# Patient Record
Sex: FEMALE | Race: WHITE | NOT HISPANIC OR LATINO | Employment: OTHER | URBAN - METROPOLITAN AREA
[De-identification: names, ages, dates, MRNs, and addresses within clinical notes are randomized per-mention and may not be internally consistent; named-entity substitution may affect disease eponyms.]

---

## 2017-12-05 ENCOUNTER — OFFICE VISIT (OUTPATIENT)
Dept: HEMATOLOGY | Facility: CLINIC | Age: 67
End: 2017-12-05
Attending: INTERNAL MEDICINE
Payer: MEDICARE

## 2017-12-05 VITALS
HEART RATE: 66 BPM | BODY MASS INDEX: 21.51 KG/M2 | HEIGHT: 65 IN | WEIGHT: 129.1 LBS | OXYGEN SATURATION: 99 % | RESPIRATION RATE: 12 BRPM | TEMPERATURE: 97.5 F | DIASTOLIC BLOOD PRESSURE: 61 MMHG | SYSTOLIC BLOOD PRESSURE: 112 MMHG

## 2017-12-05 DIAGNOSIS — Z86.711 HISTORY OF PULMONARY EMBOLISM: ICD-10-CM

## 2017-12-05 DIAGNOSIS — Z86.718 HISTORY OF DVT (DEEP VEIN THROMBOSIS): Primary | ICD-10-CM

## 2017-12-05 DIAGNOSIS — Z79.01 LONG TERM CURRENT USE OF ANTICOAGULANT THERAPY: ICD-10-CM

## 2017-12-05 PROCEDURE — 99213 OFFICE O/P EST LOW 20 MIN: CPT | Performed by: INTERNAL MEDICINE

## 2017-12-05 PROCEDURE — 99211 OFF/OP EST MAY X REQ PHY/QHP: CPT

## 2017-12-05 RX ORDER — CALCIUM CARBONATE 500(1250)
1 TABLET ORAL 2 TIMES DAILY
COMMUNITY

## 2017-12-05 ASSESSMENT — PAIN SCALES - GENERAL: PAINLEVEL: NO PAIN (0)

## 2017-12-05 NOTE — MR AVS SNAPSHOT
"              After Visit Summary   12/5/2017    Mary Lemon    MRN: [id number]           Patient Information     Date Of Birth          1950        Visit Information        Provider Department      12/5/2017 10:00 AM Onur Schmitz MD [facility]        Today's Diagnoses     History of DVT (deep vein thrombosis)    -  1    History of pulmonary embolism        Long term current use of anticoagulant therapy          Care Instructions    1.  Continue on Warfarin with home INR monitor.  2.  See back in 1 year.          Follow-ups after your visit        Your next 10 appointments already scheduled     Nov 13, 2018  1:30 PM CST   RETURN CLOTTING DISORDER with Onur Schmitz MD   [facility] ([facility])    [facility] [place] [id number] [phone]              Who to contact     If you have questions or need follow up information about today's clinic visit or your schedule please contact [facility] directly at [phone].  Normal or non-critical lab and imaging results will be communicated to you by Comekshart, letter or phone within 4 business days after the clinic has received the results. If you do not hear from us within 7 days, please contact the clinic through Uniquedut or phone. If you have a critical or abnormal lab result, we will notify you by phone as soon as possible.  Submit refill requests through shopa or call your pharmacy and they will forward the refill request to us. Please allow 3 business days for your refill to be completed.          Additional Information About Your Visit        ComeksharSqeeqee Information     shopa lets you send messages to your doctor, view your test results, renew your prescriptions, schedule appointments and more. To sign up, go to [email or web address] . Click on \"Log in\" on the left side of the screen, " "which will take you to the Welcome page. Then click on \"Sign up Now\" on the right side of the page.     You will be asked to enter the access code listed below, as well as some personal information. Please follow the directions to create your username and password.     Your access code is: [email or web address]  Expires: 3/5/2018 10:41 AM     Your access code will  in 90 days. If you need help or a new code, please call your [facility]3] or [phone].        Care EveryWhere ID     This is your Care EveryWhere ID. This could be used by other organizations to access your [place] medical records  [id number]        Your Vitals Were     Pulse Temperature Respirations Height Pulse Oximetry BMI (Body Mass Index)    66 97.5  F (36.4  C) (Oral) 12 1.638 m (5' 4.5\") 99% 21.82 kg/m2       Blood Pressure from Last 3 Encounters:   17 112/61   10/17/16 125/75   10/06/15 134/69    Weight from Last 3 Encounters:   17 58.6 kg (129 lb 1.6 oz)   10/17/16 59.1 kg (130 lb 4.8 oz)   10/06/15 60.1 kg (132 lb 8 oz)              Today, you had the following     No orders found for display       Primary Care Provider    None Specified       No primary provider on file.        Equal Access to Services     [facility] Hospital[facility] : Hadii judd Brunson, waaxda aixa, qaybta pito piedra . So [facility] [phone].    ATENCIÓN: Si habla español, tiene a moseley disposición servicios gratuitos de asistencia lingüística. Gladys al [phone].    We comply with applicable federal civil rights laws and [place] laws. We do not discriminate on the basis of race, color, national origin, age, disability, sex, sexual orientation, or gender identity.            Thank you!     Thank you for choosing [facility]  for your care. Our goal is always to provide you with excellent care. Hearing back from our patients is one way we can continue to improve our services. " Please take a few minutes to complete the written survey that you may receive in the mail after your visit with us. Thank you!             Your Updated Medication List - Protect others around you: Learn how to safely use, store and throw away your medicines at www.disposemymeds.org.          This list is accurate as of: 12/5/17 10:41 AM.  Always use your most recent med list.                   Brand Name Dispense Instructions for use Diagnosis    calcium carbonate 1250 MG tablet    OS-QUIRINO 500 mg [place]. Ca     Take 1 tablet by mouth 2 times daily        COUMADIN PO           lovastatin 40 MG tablet    MEVACOR     Take 40 mg by mouth At Bedtime        VITAMIN D (CHOLECALCIFEROL) PO      Take 1,000 Units by mouth daily

## 2017-12-05 NOTE — PROGRESS NOTES
[facility]  Outpatient Clinic Visit    Mary Lemon is a 66-year-old woman with a history of recurrent venous thromboembolism who returns today to review her long-term anticoagulation plan.  She had a provoked right calf deep vein thrombosis in 1999 associated with hormone replacement therapy for which she received 3 months of anticoagulation.  She had another provoked right lower extremity deep vein thrombosis in 2006 secondary to a prolonged car ride for which she received 6 months of anticoagulation.  In 03/2015 she had bilateral pulmonary emboli provoked again by prolonged car travel.  She has remained on long-term anticoagulation since that time.  In November 2015 we stopped her anticoagulation to allow for laboratory testing to try to determine the degree of activation of her clotting system at baseline.  Those results returned showing an elevated  D-dimer of 0.9 and factor VIII activity of 232.  Fibrinogen and prothrombin fragment levels were normal.  Note that she has had factor VIII levels elevated in the 230% range on two previous occasions dating back over the last 10 years as well.  Previous full hypercoagulable workup was unrevealing.  After the results of those tests, she elected to resume anticoagulation with warfarin.       Overall, things are going quite well for Mary on warfarin.  She is still using a home INR monitor.  Her INR is very stable in the target range of 2-3.  In the last year she has had only 2 values outside the target range (one 3.8, and one 1.9).  She is testing every 2 weeks.  She has had no bleeding issues.  She has had nothing to suggest recurrent thrombosis or post-thrombotic syndrome.       Overall, she has remained healthy.  She and her  will spend the winter again in [place], north of [facility].       PHYSICAL EXAMINATION:  Overall, she looks quite healthy.  A detailed exam was not performed today.       LABORATORY DATA:  No new labs  were obtained as part of today's visit.        ASSESSMENT AND PLAN:   1.  Provoked right lower extremity deep vein thrombosis in 1999 associated with hormone replacement therapy, status post 3 months of anticoagulation.   2.  Provoked right lower extremity deep vein thrombosis in 2006 secondary to prolonged car travel, status post 6 months of anticoagulation.    3.  Bilateral pulmonary emboli in 03/2015 probably provoked by prolonged car travel, status post  6 months of anticoagulation.   4.  Subsequent laboratory testing showing evidence of baseline activation of the clotting system with an elevated  D-dimer and a persistently elevated factor VIII level.   5.  Long-term anticoagulation.      Mary is doing well on long term anticoagulation, for which she remains an appropriate candidate, and we will make no change in her overall care plan today.  She is comfortable remaining on warfarin rather than switching to one of the DOACs.  We should continue to revisit this annually.  She will continue with her home INR monitoring with which she is very satisfied.  She will call if she has any new questions or concerns between now and her return visit next year.       Total time spent today was 15 minutes, all of which was in counseling and coordination of care.       Onur Schmitz MD  Associate Professor of Medicine  Division of Hematology, Oncology, and Transplantation  Director, [facility]

## 2017-12-05 NOTE — NURSING NOTE
Face to face time with patient taking vital signs, reviewing allergies, medications and establishing focus of the visit: 6 minutes with CNA, 3 minutes with RN.  Manda Veliz RN - Nurse Clinician - [facility] - [phone]

## 2017-12-05 NOTE — NURSING NOTE
Mary Lemon  MRN:   [id number]  Female, 66 year old, 1950     Saw Manda with Dr. Schmitz.  She has had no bleeding issues.  She has had very stable INRs with home monitor.  She has no upcoming procedures, but has dental cleaning tomorrow.  If she had procedures, we most likely would use Lovenox 40 mg daily after restarting Warfarin.  We will see her back in 1 year.  She was given our new contact information for our clinic.  Jacinta Sewell, RN, MSN -Nurse Clinician, [facility]

## 2018-09-25 ENCOUNTER — TELEPHONE (OUTPATIENT)
Dept: HEMATOLOGY | Facility: CLINIC | Age: 68
End: 2018-09-25

## 2018-12-03 ENCOUNTER — OFFICE VISIT (OUTPATIENT)
Dept: HEMATOLOGY | Facility: CLINIC | Age: 68
End: 2018-12-03
Attending: PHYSICIAN ASSISTANT
Payer: MEDICARE

## 2018-12-03 VITALS
HEART RATE: 64 BPM | OXYGEN SATURATION: 97 % | HEIGHT: 65 IN | BODY MASS INDEX: 21.34 KG/M2 | RESPIRATION RATE: 12 BRPM | TEMPERATURE: 97.6 F | DIASTOLIC BLOOD PRESSURE: 74 MMHG | SYSTOLIC BLOOD PRESSURE: 136 MMHG | WEIGHT: 128.1 LBS

## 2018-12-03 DIAGNOSIS — Z86.718 HISTORY OF DEEP VENOUS THROMBOSIS: ICD-10-CM

## 2018-12-03 DIAGNOSIS — Z86.711 HISTORY OF PULMONARY EMBOLISM: ICD-10-CM

## 2018-12-03 DIAGNOSIS — Z79.01 CHRONIC ANTICOAGULATION: Primary | ICD-10-CM

## 2018-12-03 PROCEDURE — 99213 OFFICE O/P EST LOW 20 MIN: CPT | Performed by: PHYSICIAN ASSISTANT

## 2018-12-03 PROCEDURE — G0463 HOSPITAL OUTPT CLINIC VISIT: HCPCS

## 2018-12-03 ASSESSMENT — PAIN SCALES - GENERAL: PAINLEVEL: NO PAIN (0)

## 2018-12-03 NOTE — NURSING NOTE
Mary Lemon  MRN: [id number]  Female, 67 year old, 1950  Hx recurrent DVT/PE  Long-term anticoagulation with Warfarin & home INR monitor     Saw Mary Lemon with NITISH Leyva today.  She has been using home INR monitor and was affected by strip recall, but now has new strips and INR stable.  She has had no new clotting and having no bleeding issues on Warfarin.  Discussed option of Xarelto and she was interested, but cost would be $200+/month and Warfarin & test strips covered by her insurance.  She will stay on Warfarin and see back annually to review.  Jacinta Sewell, RN, MSN -Nurse Clinician, [facility] [phone]

## 2018-12-03 NOTE — PATIENT INSTRUCTIONS
1.  Call with surgeries or procedures.  2.  Stay on Warfarin.  3.  See Dr. Schmitz or NITISH Leyva back in 1 year.

## 2018-12-03 NOTE — PROGRESS NOTES
[facility]  [address] [place] [id number]  Main: [phone], Fax: [phone]    Patient seen at: [facility] at [address] [place] [address].    Outpatient Visit Note:    Patient: Mary Lemon  MRN: [id number]  : 1950  VIRAL: December 3, 2018    Reason:  History recurrent DVT and PE. On chronic anticoagulation therapy. Here for her routine annual follow up clinic visit.     Clinical History Summary:  Mary Lemon is a 67-year-old woman with a history of recurrent venous thromboembolism who returns today to review her long-term anticoagulation plan.  She had a provoked right calf deep vein thrombosis in  associated with hormone replacement therapy for which she received 3 months of anticoagulation. She had another provoked right lower extremity deep vein thrombosis in  secondary to a prolonged car ride for which she received 6 months of anticoagulation.  In 2015 she had bilateral pulmonary emboli provoked again by prolonged car travel.  She has remained on long-term anticoagulation since that time.  In 2015 we stopped her anticoagulation to allow for laboratory testing to try to determine the degree of activation of her clotting system at baseline.  Those results returned showing an elevated  D-dimer of 0.9 and factor VIII activity of 232.  Fibrinogen and prothrombin fragment levels were normal.  Note that she has had factor VIII levels elevated in the 230% range on two previous occasions dating back over the last 10 years as well.  Previous full hypercoagulable workup was unrevealing.  After the results of those tests, she elected to resume anticoagulation with warfarin with her goal INR at 2.0-3.0.     Interim History:  Mary has a home INR monitor and she was affected by the test strips recall early in the year, although she did not have any issues with bleeding complications or any issues  "with recurrent thromboembolism due to this test strips recall incident. Her INR was erratic for that period of time while she was using the defective test strips.     ROS:  Denies any issues with chest pain. No shortness of breath. Denies any significant lower extremity swelling or pain. Denies any issues with hematuria or blood in stools. Denies any symptoms of epistaxis. No issues with gum bleeding.     Medications, Allergies and PmHx:  All are reviewed by this writer in the electronic medical records today.     Family History and Social History:  Deferred.    Objective:  Pleasant 67 year old female in no acute distress.  Vitals: /74 (BP Location: Right arm, Patient Position: Sitting, Cuff Size: Adult Regular)  Pulse 64  Temp 97.6  F (36.4  C) (Oral)  Resp 12  Ht 1.651 m (5' 5\")  Wt 58.1 kg (128 lb 1.6 oz)  SpO2 97%  BMI 21.32 kg/m2  Exam:  Complete exam is not performed today.    Assessment:  1. Provoked right lower extremity deep vein thrombosis in 1999 associated with hormone replacement therapy, status post 3 months of anticoagulation.   2. Provoked right lower extremity deep vein thrombosis in 2006 secondary to prolonged car travel, status post 6 months of anticoagulation.    3. Bilateral pulmonary emboli in 03/2015 probably provoked by prolonged car travel, status post  6 months of anticoagulation.   4. Subsequent laboratory testing showing evidence of baseline activation of the clotting system with an elevated  D-dimer and a persistently elevated factor VIII level.   5. Long-term anticoagulation.     Plan:  Doing well on anticoagulation therapy with Coumadin. This writer revisited about switching to one of the new oral anticoagulant agents (i.e. Xarelto) with the patient today. We discussed the pros and cons of Coumadin vs Xarelto in details and answered all her questions to her satisfaction today.     After a long discussion, Mary initially was in agreement to switch over to Xarelto at 10 mg " PO Qday dosing. Unfortunately, after we checked into her insurance coverage, her co-pay for Xarelto will be >$200 a month and thus Xarelto is deemed cost prohibiting.     At this time, Mary will remain on Coumadin with her goal INR of 2.0-3.0.     She knows to contact our center if she should need any surgical procedures or invasive procedures. At which time, our clinic will provide her with anticoagulation management plans surrounding the procedures.     Otherwise, we will plan on seeing her back annually for routine follow up clinic visits.    Total Time Spent:  22 minutes, all 22 minutes was spent on face-to-face consultation with the patient and coordination of care in regard to her history of DVT/PE and chronic anticoagulation therapy.     Time IN: 13:36  Time OUT: 13:58      Gorge Yeager PA-C, MPAS  Physician Assistant  [facility].

## 2019-10-10 ENCOUNTER — APPOINTMENT (OUTPATIENT)
Age: 69
Setting detail: DERMATOLOGY
End: 2019-10-16

## 2019-10-10 VITALS — HEIGHT: 65 IN | RESPIRATION RATE: 14 BRPM | WEIGHT: 125 LBS

## 2019-10-10 DIAGNOSIS — L82.1 OTHER SEBORRHEIC KERATOSIS: ICD-10-CM

## 2019-10-10 DIAGNOSIS — L72.0 EPIDERMAL CYST: ICD-10-CM

## 2019-10-10 DIAGNOSIS — L81.4 OTHER MELANIN HYPERPIGMENTATION: ICD-10-CM

## 2019-10-10 DIAGNOSIS — B00.1 HERPESVIRAL VESICULAR DERMATITIS: ICD-10-CM

## 2019-10-10 DIAGNOSIS — L57.0 ACTINIC KERATOSIS: ICD-10-CM

## 2019-10-10 DIAGNOSIS — D18.0 HEMANGIOMA: ICD-10-CM

## 2019-10-10 PROBLEM — D18.01 HEMANGIOMA OF SKIN AND SUBCUTANEOUS TISSUE: Status: ACTIVE | Noted: 2019-10-10

## 2019-10-10 PROCEDURE — 99214 OFFICE O/P EST MOD 30 MIN: CPT | Mod: 25

## 2019-10-10 PROCEDURE — OTHER LIQUID NITROGEN: OTHER

## 2019-10-10 PROCEDURE — OTHER COUNSELING: OTHER

## 2019-10-10 PROCEDURE — 17000 DESTRUCT PREMALG LESION: CPT

## 2019-10-10 PROCEDURE — 17003 DESTRUCT PREMALG LES 2-14: CPT

## 2019-10-10 PROCEDURE — OTHER PRESCRIPTION: OTHER

## 2019-10-10 RX ORDER — VALACYCLOVIR 1 G/1
TABLET ORAL
Qty: 20 | Refills: 1 | Status: ERX | COMMUNITY
Start: 2019-10-10

## 2019-10-10 ASSESSMENT — LOCATION SIMPLE DESCRIPTION DERM
LOCATION SIMPLE: UPPER BACK
LOCATION SIMPLE: RIGHT UPPER BACK
LOCATION SIMPLE: LEFT NOSE
LOCATION SIMPLE: RIGHT UPPER ARM
LOCATION SIMPLE: LEFT LIP
LOCATION SIMPLE: CHEST
LOCATION SIMPLE: RIGHT CHEEK
LOCATION SIMPLE: LEFT CHEEK
LOCATION SIMPLE: RIGHT LIP
LOCATION SIMPLE: RIGHT FOREHEAD

## 2019-10-10 ASSESSMENT — LOCATION DETAILED DESCRIPTION DERM
LOCATION DETAILED: LEFT NASAL ALA
LOCATION DETAILED: RIGHT ANTERIOR DISTAL UPPER ARM
LOCATION DETAILED: LOWER STERNUM
LOCATION DETAILED: LEFT MEDIAL MALAR CHEEK
LOCATION DETAILED: RIGHT INFERIOR FOREHEAD
LOCATION DETAILED: MIDDLE STERNUM
LOCATION DETAILED: LEFT SUPERIOR VERMILION LIP
LOCATION DETAILED: RIGHT MEDIAL SUPERIOR CHEST
LOCATION DETAILED: RIGHT MID-UPPER BACK
LOCATION DETAILED: RIGHT CENTRAL MALAR CHEEK
LOCATION DETAILED: RIGHT INFERIOR VERMILION LIP
LOCATION DETAILED: SUPERIOR THORACIC SPINE

## 2019-10-10 ASSESSMENT — LOCATION ZONE DERM
LOCATION ZONE: LIP
LOCATION ZONE: ARM
LOCATION ZONE: TRUNK
LOCATION ZONE: NOSE
LOCATION ZONE: FACE

## 2019-10-10 NOTE — PROCEDURE: LIQUID NITROGEN
Duration Of Freeze Thaw-Cycle (Seconds): 0
Render Post-Care Instructions In Note?: yes
Post-Care Instructions: Patient was instructed to avoid picking at any of the treated lesions. Should any lesions treated today not be resolved within 8 weeks or if not certain, then return to clinic for re-evaluation.
Detail Level: Detailed
Consent: Discussed that these are a result of cumulative sun exposure. Verbal and written consent was obtained, and risks were reviewed prior to procedure today. Risks discussed include but are not limited to pain, crusting, scabbing, blistering, scarring, temporary or permanent darker or lighter pigmentary change, recurrence, incomplete resolution, and infection.
Render Note In Bullet Format When Appropriate: No

## 2019-11-02 NOTE — PROGRESS NOTES
[facility]  [facility]  [facility] [place]note 2[facility], [place] [address]  Main: [phone], Fax: [phone]        Outpatient Clinic Visit  Date:  11/04/2019    Mary Lemon is a 68-year-old woman with a history of recurrent venous thromboembolism who returns today to review her long-term anticoagulation plan.  She was last seen in December 2017.      She had a provoked right calf deep vein thrombosis in 1999 associated with hormone replacement therapy for which she received 3 months of anticoagulation.  She had another provoked right lower extremity deep vein thrombosis in 2006 secondary to a prolonged car ride for which she received 6 months of anticoagulation.  In 03/2015 she had bilateral pulmonary emboli provoked again by prolonged car travel.  She has remained on long-term anticoagulation since that time.  In November 2015 we stopped her anticoagulation to allow for laboratory testing to try to determine the degree of activation of her clotting system at baseline.  Those results returned showing an elevated  D-dimer of 0.9 and factor VIII activity of 232.  Fibrinogen and prothrombin fragment levels were normal.  Note that she has had factor VIII levels elevated in the 230% range on two previous occasions dating back over the last 10 years as well.  Previous full hypercoagulable workup was unrevealing.  After the results of those tests, she elected to resume anticoagulation with warfarin.       Overall, she is continuing to do quite well.  She is still using a home INR monitor.  Her INR is very stable in the target range of 2-3.  She is testing every 2 weeks.  She has had no bleeding issues.  She did recently have a supratherapeutic INR of 6.2 associated with antibiotic use for a UTI.  This came back into range after holding warfarin for a couple of days under the direction of her anticoagulation monitoring team.  She has had nothing to suggest  recurrent thrombosis or post-thrombotic syndrome.       Overall, she has remained healthy.  She and her  will spend the winter again in [place], north of [facility].       PHYSICAL EXAMINATION:  Overall, she looks quite healthy.  A detailed exam was not performed today.       LABORATORY DATA:  No new labs were obtained as part of today's visit.        ASSESSMENT AND PLAN:   1.  Provoked right lower extremity deep vein thrombosis in 1999 associated with hormone replacement therapy, status post 3 months of anticoagulation.   2.  Provoked right lower extremity deep vein thrombosis in 2006 secondary to prolonged car travel, status post 6 months of anticoagulation.    3.  Bilateral pulmonary emboli in 03/2015 probably provoked by prolonged car travel, status post  6 months of anticoagulation.   4.  Subsequent laboratory testing showing evidence of baseline activation of the clotting system with an elevated  D-dimer and a persistently elevated factor VIII level.   5.  Long-term anticoagulation.      Mary is doing well on long term anticoagulation, for which she remains an appropriate candidate, and we will make no change in her overall care plan today.  She is comfortable remaining on warfarin rather than switching to one of the DOACs.  We have discussed this in the past as well.  She will continue with her home INR monitoring.  We should continue to see her back annually.  She will call with any new questions or concerns in the meantime.     Total time spent today was 15 minutes, all of which was in counseling and coordination of care.       Onur Schmitz MD  Associate Professor of Medicine  Division of Hematology, Oncology, and Transplantation  Director, [facility]

## 2019-11-04 ENCOUNTER — OFFICE VISIT (OUTPATIENT)
Dept: HEMATOLOGY | Facility: CLINIC | Age: 69
End: 2019-11-04
Attending: INTERNAL MEDICINE
Payer: MEDICARE

## 2019-11-04 VITALS
HEIGHT: 65 IN | TEMPERATURE: 97.3 F | WEIGHT: 126.4 LBS | BODY MASS INDEX: 21.06 KG/M2 | SYSTOLIC BLOOD PRESSURE: 148 MMHG | DIASTOLIC BLOOD PRESSURE: 76 MMHG | RESPIRATION RATE: 12 BRPM | OXYGEN SATURATION: 98 % | HEART RATE: 71 BPM

## 2019-11-04 DIAGNOSIS — Z86.711 HISTORY OF PULMONARY EMBOLISM: ICD-10-CM

## 2019-11-04 DIAGNOSIS — Z79.01 LONG TERM CURRENT USE OF ANTICOAGULANT THERAPY: ICD-10-CM

## 2019-11-04 DIAGNOSIS — Z86.718 HISTORY OF DEEP VENOUS THROMBOSIS: Primary | ICD-10-CM

## 2019-11-04 PROCEDURE — G0463 HOSPITAL OUTPT CLINIC VISIT: HCPCS

## 2019-11-04 PROCEDURE — 99213 OFFICE O/P EST LOW 20 MIN: CPT | Performed by: INTERNAL MEDICINE

## 2019-11-04 ASSESSMENT — MIFFLIN-ST. JEOR: SCORE: 1094.23

## 2019-11-04 ASSESSMENT — PAIN SCALES - GENERAL: PAINLEVEL: NO PAIN (0)

## 2019-12-12 ENCOUNTER — APPOINTMENT (OUTPATIENT)
Age: 69
Setting detail: DERMATOLOGY
End: 2019-12-12

## 2019-12-12 VITALS — RESPIRATION RATE: 16 BRPM | WEIGHT: 125 LBS | HEIGHT: 65 IN

## 2019-12-12 DIAGNOSIS — L57.0 ACTINIC KERATOSIS: ICD-10-CM

## 2019-12-12 PROBLEM — L81.0 POSTINFLAMMATORY HYPERPIGMENTATION: Status: ACTIVE | Noted: 2019-12-12

## 2019-12-12 PROCEDURE — 17003 DESTRUCT PREMALG LES 2-14: CPT

## 2019-12-12 PROCEDURE — OTHER COUNSELING: OTHER

## 2019-12-12 PROCEDURE — 99213 OFFICE O/P EST LOW 20 MIN: CPT | Mod: 25

## 2019-12-12 PROCEDURE — OTHER LIQUID NITROGEN: OTHER

## 2019-12-12 PROCEDURE — 17000 DESTRUCT PREMALG LESION: CPT

## 2019-12-12 PROCEDURE — OTHER ADDITIONAL NOTES: OTHER

## 2019-12-12 ASSESSMENT — LOCATION SIMPLE DESCRIPTION DERM
LOCATION SIMPLE: NOSE
LOCATION SIMPLE: LEFT CHEEK

## 2019-12-12 ASSESSMENT — LOCATION DETAILED DESCRIPTION DERM
LOCATION DETAILED: LEFT INFERIOR MEDIAL MALAR CHEEK
LOCATION DETAILED: NASAL DORSUM
LOCATION DETAILED: NASAL ROOT

## 2019-12-12 ASSESSMENT — LOCATION ZONE DERM
LOCATION ZONE: FACE
LOCATION ZONE: NOSE

## 2019-12-12 NOTE — PROCEDURE: COUNSELING
Detail Level: Detailed
Patient Specific Counseling (Will Not Stick From Patient To Patient): If still roughness persisting since today is second tx will likely recommend field therapy creams we briefly disc today.

## 2019-12-12 NOTE — PROCEDURE: ADDITIONAL NOTES
Detail Level: Detailed
Additional Notes: - Advised that the actinic keratosis previously treated appears resolved today. \\n- However, recurrence is possible and more may develop on other areas in the future. \\n- Any remaining postinflammatory pigmentation is likely to fade with time.\\n- Return to clinic should any new rough areas develop or any previously treated areas seem to recur. \\n- Diligent use of sun protection/sun screen is recommended.

## 2020-05-06 ENCOUNTER — APPOINTMENT (OUTPATIENT)
Age: 70
Setting detail: DERMATOLOGY
End: 2020-05-06

## 2020-05-06 VITALS — RESPIRATION RATE: 14 BRPM | HEIGHT: 65 IN | WEIGHT: 125 LBS

## 2020-05-06 DIAGNOSIS — L81.8 OTHER SPECIFIED DISORDERS OF PIGMENTATION: ICD-10-CM

## 2020-05-06 DIAGNOSIS — L57.0 ACTINIC KERATOSIS: ICD-10-CM

## 2020-05-06 DIAGNOSIS — L82.1 OTHER SEBORRHEIC KERATOSIS: ICD-10-CM

## 2020-05-06 PROCEDURE — OTHER COUNSELING: OTHER

## 2020-05-06 PROCEDURE — OTHER ADDITIONAL NOTES: OTHER

## 2020-05-06 PROCEDURE — 99213 OFFICE O/P EST LOW 20 MIN: CPT

## 2020-05-06 ASSESSMENT — LOCATION SIMPLE DESCRIPTION DERM
LOCATION SIMPLE: RIGHT FOREHEAD
LOCATION SIMPLE: NOSE
LOCATION SIMPLE: LEFT CHEEK

## 2020-05-06 ASSESSMENT — LOCATION DETAILED DESCRIPTION DERM
LOCATION DETAILED: LEFT MEDIAL MALAR CHEEK
LOCATION DETAILED: RIGHT MEDIAL FOREHEAD
LOCATION DETAILED: NASAL DORSUM
LOCATION DETAILED: LEFT INFERIOR MEDIAL MALAR CHEEK
LOCATION DETAILED: LEFT INFERIOR CENTRAL MALAR CHEEK

## 2020-05-06 ASSESSMENT — LOCATION ZONE DERM
LOCATION ZONE: FACE
LOCATION ZONE: NOSE

## 2020-05-06 NOTE — PROCEDURE: ADDITIONAL NOTES
Detail Level: Simple
Additional Notes: -Appear resolved upon exam today.
Additional Notes: -All AK’s treated appear resolved upon exam today.

## 2020-09-17 ENCOUNTER — APPOINTMENT (OUTPATIENT)
Dept: URBAN - METROPOLITAN AREA CLINIC 254 | Age: 70
Setting detail: DERMATOLOGY
End: 2020-09-21

## 2020-09-17 VITALS — RESPIRATION RATE: 17 BRPM | WEIGHT: 125 LBS | HEIGHT: 65 IN

## 2020-09-17 DIAGNOSIS — D22 MELANOCYTIC NEVI: ICD-10-CM

## 2020-09-17 DIAGNOSIS — D18.0 HEMANGIOMA: ICD-10-CM

## 2020-09-17 DIAGNOSIS — L82.0 INFLAMED SEBORRHEIC KERATOSIS: ICD-10-CM

## 2020-09-17 DIAGNOSIS — L82.1 OTHER SEBORRHEIC KERATOSIS: ICD-10-CM

## 2020-09-17 DIAGNOSIS — L84 CORNS AND CALLOSITIES: ICD-10-CM

## 2020-09-17 DIAGNOSIS — L81.4 OTHER MELANIN HYPERPIGMENTATION: ICD-10-CM

## 2020-09-17 PROBLEM — D22.5 MELANOCYTIC NEVI OF TRUNK: Status: ACTIVE | Noted: 2020-09-17

## 2020-09-17 PROBLEM — D18.01 HEMANGIOMA OF SKIN AND SUBCUTANEOUS TISSUE: Status: ACTIVE | Noted: 2020-09-17

## 2020-09-17 PROCEDURE — OTHER COUNSELING: OTHER

## 2020-09-17 PROCEDURE — 99214 OFFICE O/P EST MOD 30 MIN: CPT

## 2020-09-17 ASSESSMENT — LOCATION SIMPLE DESCRIPTION DERM
LOCATION SIMPLE: UPPER BACK
LOCATION SIMPLE: LEFT ANKLE
LOCATION SIMPLE: CHEST
LOCATION SIMPLE: ABDOMEN
LOCATION SIMPLE: LEFT PLANTAR SURFACE
LOCATION SIMPLE: RIGHT UPPER BACK
LOCATION SIMPLE: RIGHT FOREARM

## 2020-09-17 ASSESSMENT — LOCATION ZONE DERM
LOCATION ZONE: ARM
LOCATION ZONE: LEG
LOCATION ZONE: FEET
LOCATION ZONE: TRUNK

## 2020-09-17 ASSESSMENT — LOCATION DETAILED DESCRIPTION DERM
LOCATION DETAILED: RIGHT MEDIAL SUPERIOR CHEST
LOCATION DETAILED: RIGHT DISTAL DORSAL FOREARM
LOCATION DETAILED: EPIGASTRIC SKIN
LOCATION DETAILED: RIGHT MEDIAL UPPER BACK
LOCATION DETAILED: LEFT PLANTAR FOREFOOT OVERLYING 3RD METATARSAL
LOCATION DETAILED: LEFT ANKLE
LOCATION DETAILED: UPPER STERNUM
LOCATION DETAILED: SUPERIOR THORACIC SPINE

## 2020-11-09 ENCOUNTER — VIRTUAL VISIT (OUTPATIENT)
Dept: HEMATOLOGY | Facility: CLINIC | Age: 70
End: 2020-11-09
Attending: INTERNAL MEDICINE
Payer: MEDICARE

## 2020-11-09 VITALS — WEIGHT: 125 LBS | BODY MASS INDEX: 20.8 KG/M2

## 2020-11-09 DIAGNOSIS — Z86.718 HISTORY OF DEEP VENOUS THROMBOSIS: Primary | ICD-10-CM

## 2020-11-09 DIAGNOSIS — Z79.01 LONG TERM CURRENT USE OF ANTICOAGULANT THERAPY: ICD-10-CM

## 2020-11-09 DIAGNOSIS — Z86.711 HISTORY OF PULMONARY EMBOLISM: ICD-10-CM

## 2020-11-09 PROCEDURE — 99212 OFFICE O/P EST SF 10 MIN: CPT | Mod: 95 | Performed by: INTERNAL MEDICINE

## 2020-11-09 NOTE — PROGRESS NOTES
Patient was contacted to complete the pre-visit call prior to their video visit with the provider.  The following statement was read:       This visit will be billed to your insurance the same as an in-person visit. Because of Coronavirus we are instituting video visits when possible to keep everyone safe. This video visit will be conducted between you and the provider.  This service lets us provide the care you need with a video conversation.  If a prescription is necessary, we can send it directly to your pharmacy.If lab work or other testing is needed, we can help arrange a place/time for that to be done at a later date.If during the course of the call the provider feels a video visit is not appropriate, then your insurance company will not be billed.       Allergies and medications were reviewed and travel screening complete.     A test connection was Completed with Pt? No    I thanked them for their time to cover this information     Cole Damian CMA         GIB (gastrointestinal bleeding)

## 2020-11-09 NOTE — PROGRESS NOTES
[facility]  [facility]  [facility] [address], [place] [address]  Main: [phone], Fax: [phone]        Outpatient Clinic Visit  Date:  11/09/2020      NOTE:  Due to the ongoing COVID-19 pandemic, this visit was conducted by video, with the patient's approval.    Mary Lemon is a 69-year-old woman with a history of recurrent venous thromboembolism who returns today to review her long-term anticoagulation plan.  She was last seen in November 2019.      She had a provoked right calf deep vein thrombosis in 1999 associated with hormone replacement therapy for which she received 3 months of anticoagulation.  She had another provoked right lower extremity deep vein thrombosis in 2006 secondary to a prolonged car ride for which she received 6 months of anticoagulation.  In March 2015 she had bilateral pulmonary emboli provoked again by prolonged car travel.  She has remained on long-term anticoagulation since that time.  In November 2015 we stopped her anticoagulation to allow for laboratory testing to try to determine the degree of activation of her clotting system at baseline.  Those results returned showing an elevated  D-dimer of 0.9 and factor VIII activity of 232.  Fibrinogen and prothrombin fragment levels were normal.  Note that she has had factor VIII levels elevated in the 230% range on two previous occasions dating back over the last 10 years as well.  Previous full hypercoagulable workup was unrevealing.  After the results of those tests, she elected to resume anticoagulation with warfarin.       Over the last year, she has continued to do well.  She is still using a home INR monitor, checking every other week.  Her INR is very stable in the target range of 2-3.  She has had no bleeding issues, and nothing to suggest recurrent thrombosis or significant posttraumatic syndrome.     PHYSICAL EXAMINATION:   She appears healthy.  A detailed  exam was not performed today (video visit).      LABORATORY DATA:  No new labs were obtained as part of today's visit.        ASSESSMENT AND PLAN:   1.  Provoked right lower extremity deep vein thrombosis in 1999 associated with hormone replacement therapy, status post 3 months of anticoagulation.   2.  Provoked right lower extremity deep vein thrombosis in 2006 secondary to prolonged car travel, status post 6 months of anticoagulation.    3.  Bilateral pulmonary emboli in 03/2015 probably provoked by prolonged car travel, status post  6 months of anticoagulation.   4.  Subsequent laboratory testing showing evidence of baseline activation of the clotting system with an elevated  D-dimer and a persistently elevated factor VIII level.   5.  Long-term anticoagulation.      Mary is doing well on long term anticoagulation, for which she remains an appropriate candidate, and we will make no change in her care plan today.  She is comfortable remaining on warfarin rather than switching to a direct oral anticoagulant.  We have discussed this in the past as well.  She will continue with her home INR monitoring.  We should continue to see her back annually.  She will call with any new questions or concerns in the meantime.     Total time 10 minutes, all in counseling and coordination of care.        Onur Schmitz MD  Associate Professor of Medicine  Division of Hematology, Oncology, and Transplantation  Director, [facility]

## 2020-11-29 ENCOUNTER — HEALTH MAINTENANCE LETTER (OUTPATIENT)
Age: 70
End: 2020-11-29

## 2020-12-04 ENCOUNTER — HOSPITAL ENCOUNTER (OUTPATIENT)
Dept: ULTRASOUND IMAGING | Facility: CLINIC | Age: 70
Discharge: HOME | End: 2020-12-04
Attending: INTERNAL MEDICINE | Admitting: INTERNAL MEDICINE
Payer: MEDICARE

## 2020-12-04 ENCOUNTER — TELEPHONE (OUTPATIENT)
Dept: HEMATOLOGY | Facility: CLINIC | Age: 70
End: 2020-12-04

## 2020-12-04 DIAGNOSIS — Z79.01 CHRONIC ANTICOAGULATION: ICD-10-CM

## 2020-12-04 DIAGNOSIS — Z86.718 HISTORY OF DEEP VENOUS THROMBOSIS: Primary | ICD-10-CM

## 2020-12-04 DIAGNOSIS — Z86.718 HISTORY OF DEEP VENOUS THROMBOSIS: ICD-10-CM

## 2020-12-04 PROCEDURE — 93971 EXTREMITY STUDY: CPT | Mod: LT

## 2020-12-04 NOTE — TELEPHONE ENCOUNTER
Mary Lemon  [id number]  1950    Mary Lemon called today.  She reports leg achiness for the past few weeks in her outer left thigh.  Then it moved to outer left calf area of leg and is more shooting discomfort. She thought it was a pinched nerve.   Her PCP was thinking L5 nerve pain, but Mary has had no back pain.  Her PCP is watching & waiting with her symptoms.  With Mary's history of DVT/PE, she is wondering about this as a possiblity. However, she states that it does not feel the same as her past DVTs.  She has been therapeutic with Warfarin & has had very stable INR in mid 2 range for past couple months.    Discussed with Dr. Schmitz who would like Mary to get an US of her left leg today.  Will try to arrange at [facility]alth clinic[facility]hospital near her home.  Relayed to patient who agrees with plan.  Jacinta Sewell, MSN, RN, PHN -Nurse Clinician, [facility] [phone]

## 2020-12-04 NOTE — TELEPHONE ENCOUNTER
Mary Lemon  [id number]  1950    Dr. Schmitz notified me of Mary Lemon's US results. She does not have a clot in her left leg.  Patient was notified of this result.  Jacinta Sewell, MSN, RN, PHN -Nurse Clinician, [facility] [phone]

## 2021-01-25 ENCOUNTER — DOCUMENTATION ONLY (OUTPATIENT)
Dept: HEMATOLOGY | Facility: CLINIC | Age: 71
End: 2021-01-25

## 2021-03-24 ENCOUNTER — IMMUNIZATION (OUTPATIENT)
Dept: PEDIATRICS | Facility: CLINIC | Age: 71
End: 2021-03-24
Payer: MEDICARE

## 2021-03-24 PROCEDURE — 0001A PR COVID VAC PFIZER DIL RECON 30 MCG/0.3 ML IM: CPT

## 2021-03-24 PROCEDURE — 91300 PR COVID VAC PFIZER DIL RECON 30 MCG/0.3 ML IM: CPT

## 2021-04-14 ENCOUNTER — OFFICE VISIT (OUTPATIENT)
Dept: PEDIATRICS | Facility: CLINIC | Age: 71
End: 2021-04-14
Attending: INTERNAL MEDICINE
Payer: MEDICARE

## 2021-04-14 PROCEDURE — 91300 PR COVID VAC PFIZER DIL RECON 30 MCG/0.3 ML IM: CPT

## 2021-04-14 PROCEDURE — 0002A PR COVID VAC PFIZER DIL RECON 30 MCG/0.3 ML IM: CPT

## 2021-04-22 ENCOUNTER — APPOINTMENT (OUTPATIENT)
Dept: URBAN - METROPOLITAN AREA CLINIC 254 | Age: 71
Setting detail: DERMATOLOGY
End: 2021-04-23

## 2021-04-22 VITALS — WEIGHT: 125 LBS | HEIGHT: 65 IN | RESPIRATION RATE: 14 BRPM

## 2021-04-22 DIAGNOSIS — L81.4 OTHER MELANIN HYPERPIGMENTATION: ICD-10-CM

## 2021-04-22 DIAGNOSIS — D22 MELANOCYTIC NEVI: ICD-10-CM

## 2021-04-22 DIAGNOSIS — Z85.820 PERSONAL HISTORY OF MALIGNANT MELANOMA OF SKIN: ICD-10-CM

## 2021-04-22 DIAGNOSIS — Z71.89 OTHER SPECIFIED COUNSELING: ICD-10-CM

## 2021-04-22 DIAGNOSIS — D18.0 HEMANGIOMA: ICD-10-CM

## 2021-04-22 DIAGNOSIS — L82.1 OTHER SEBORRHEIC KERATOSIS: ICD-10-CM

## 2021-04-22 PROBLEM — D18.01 HEMANGIOMA OF SKIN AND SUBCUTANEOUS TISSUE: Status: ACTIVE | Noted: 2021-04-22

## 2021-04-22 PROBLEM — D22.5 MELANOCYTIC NEVI OF TRUNK: Status: ACTIVE | Noted: 2021-04-22

## 2021-04-22 PROCEDURE — 99213 OFFICE O/P EST LOW 20 MIN: CPT

## 2021-04-22 PROCEDURE — OTHER COUNSELING: OTHER

## 2021-04-22 PROCEDURE — OTHER MIPS QUALITY: OTHER

## 2021-04-22 ASSESSMENT — LOCATION SIMPLE DESCRIPTION DERM
LOCATION SIMPLE: CHEST
LOCATION SIMPLE: LEFT THIGH
LOCATION SIMPLE: RIGHT UPPER BACK
LOCATION SIMPLE: UPPER BACK
LOCATION SIMPLE: RIGHT PRETIBIAL REGION
LOCATION SIMPLE: ABDOMEN
LOCATION SIMPLE: LEFT BREAST

## 2021-04-22 ASSESSMENT — LOCATION DETAILED DESCRIPTION DERM
LOCATION DETAILED: LEFT ANTERIOR DISTAL THIGH
LOCATION DETAILED: RIGHT MEDIAL SUPERIOR CHEST
LOCATION DETAILED: SUPERIOR THORACIC SPINE
LOCATION DETAILED: LEFT LATERAL BREAST 2-3:00 REGION
LOCATION DETAILED: RIGHT SUPERIOR MEDIAL UPPER BACK
LOCATION DETAILED: LEFT RIB CAGE
LOCATION DETAILED: RIGHT MEDIAL UPPER BACK
LOCATION DETAILED: RIGHT LATERAL DISTAL PRETIBIAL REGION
LOCATION DETAILED: EPIGASTRIC SKIN

## 2021-04-22 ASSESSMENT — LOCATION ZONE DERM
LOCATION ZONE: TRUNK
LOCATION ZONE: LEG

## 2021-04-22 NOTE — PROCEDURE: MIPS QUALITY
Quality 137: Melanoma: Continuity Of Care - Recall System: Patient information entered into a recall system that includes: target date for the next exam specified AND a process to follow up with patients regarding missed or unscheduled appointments
Detail Level: Detailed
Quality 138: Melanoma: Coordination Of Care: The patient does not have a PCP or referring physician.
Quality 397: Melanoma: Reporting: Pathology does not include pT category and/or statement on Breslow depth, ulceration, and pT1 mitotic reporting.

## 2021-09-25 ENCOUNTER — HEALTH MAINTENANCE LETTER (OUTPATIENT)
Age: 71
End: 2021-09-25

## 2021-10-26 ENCOUNTER — APPOINTMENT (OUTPATIENT)
Dept: URBAN - METROPOLITAN AREA CLINIC 254 | Age: 71
Setting detail: DERMATOLOGY
End: 2021-10-30

## 2021-10-26 VITALS — HEIGHT: 65 IN | WEIGHT: 125 LBS

## 2021-10-26 DIAGNOSIS — L82.1 OTHER SEBORRHEIC KERATOSIS: ICD-10-CM

## 2021-10-26 DIAGNOSIS — Z85.820 PERSONAL HISTORY OF MALIGNANT MELANOMA OF SKIN: ICD-10-CM

## 2021-10-26 DIAGNOSIS — D22 MELANOCYTIC NEVI: ICD-10-CM

## 2021-10-26 DIAGNOSIS — D18.0 HEMANGIOMA: ICD-10-CM

## 2021-10-26 DIAGNOSIS — L81.4 OTHER MELANIN HYPERPIGMENTATION: ICD-10-CM

## 2021-10-26 DIAGNOSIS — Z71.89 OTHER SPECIFIED COUNSELING: ICD-10-CM

## 2021-10-26 PROBLEM — D18.01 HEMANGIOMA OF SKIN AND SUBCUTANEOUS TISSUE: Status: ACTIVE | Noted: 2021-10-26

## 2021-10-26 PROBLEM — D48.5 NEOPLASM OF UNCERTAIN BEHAVIOR OF SKIN: Status: ACTIVE | Noted: 2021-10-26

## 2021-10-26 PROBLEM — D22.5 MELANOCYTIC NEVI OF TRUNK: Status: ACTIVE | Noted: 2021-10-26

## 2021-10-26 PROCEDURE — OTHER SUNSCREEN RECOMMENDATIONS: OTHER

## 2021-10-26 PROCEDURE — OTHER MIPS QUALITY: OTHER

## 2021-10-26 PROCEDURE — OTHER BIOPSY BY SHAVE METHOD: OTHER

## 2021-10-26 PROCEDURE — OTHER COUNSELING: OTHER

## 2021-10-26 PROCEDURE — 99213 OFFICE O/P EST LOW 20 MIN: CPT | Mod: 25

## 2021-10-26 PROCEDURE — 11102 TANGNTL BX SKIN SINGLE LES: CPT

## 2021-10-26 ASSESSMENT — LOCATION SIMPLE DESCRIPTION DERM
LOCATION SIMPLE: LEFT ANKLE
LOCATION SIMPLE: LEFT UPPER BACK
LOCATION SIMPLE: CHEST
LOCATION SIMPLE: ABDOMEN
LOCATION SIMPLE: RIGHT UPPER BACK
LOCATION SIMPLE: UPPER BACK
LOCATION SIMPLE: LEFT THIGH

## 2021-10-26 ASSESSMENT — LOCATION DETAILED DESCRIPTION DERM
LOCATION DETAILED: LEFT ANTERIOR DISTAL THIGH
LOCATION DETAILED: LEFT ANTERIOR MEDIAL MALLEOLUS
LOCATION DETAILED: SUPERIOR THORACIC SPINE
LOCATION DETAILED: EPIGASTRIC SKIN
LOCATION DETAILED: RIGHT MEDIAL UPPER BACK
LOCATION DETAILED: UPPER STERNUM
LOCATION DETAILED: RIGHT MEDIAL SUPERIOR CHEST
LOCATION DETAILED: LEFT SUPERIOR UPPER BACK

## 2021-10-26 ASSESSMENT — LOCATION ZONE DERM
LOCATION ZONE: TRUNK
LOCATION ZONE: LEG

## 2021-10-26 NOTE — PROCEDURE: MIPS QUALITY
Quality 431: Preventive Care And Screening: Unhealthy Alcohol Use - Screening: Patient not identified as an unhealthy alcohol user when screened for unhealthy alcohol use using a systematic screening method
Quality 130: Documentation Of Current Medications In The Medical Record: Current Medications Documented
Detail Level: Detailed
Quality 111:Pneumonia Vaccination Status For Older Adults: Pneumococcal Vaccination Previously Received
Quality 226: Preventive Care And Screening: Tobacco Use: Screening And Cessation Intervention: Patient screened for tobacco use and is an ex/non-smoker
Quality 110: Preventive Care And Screening: Influenza Immunization: Influenza Immunization previously received during influenza season
Quality 137: Melanoma: Continuity Of Care - Recall System: Patient information entered into a recall system that includes: target date for the next exam specified AND a process to follow up with patients regarding missed or unscheduled appointments
Quality 128: Preventive Care And Screening: Body Mass Index (Bmi) Screening And Follow-Up Plan: BMI is documented within normal parameters and no follow-up plan is required.

## 2021-10-26 NOTE — PROCEDURE: BIOPSY BY SHAVE METHOD
Detail Level: Detailed
Wound Care: Petrolatum
Curettage Text: The wound bed was treated with curettage after the biopsy was performed.
Validate Anticipated Plan: No
Billing Type: Third-Party Bill
Consent: Written consent was obtained and risks were reviewed including but not limited to scarring, infection, bleeding, scabbing, incomplete removal, nerve damage and allergy to anesthesia.
X Size Of Lesion In Cm: 0
Anesthesia Volume In Cc (Will Not Render If 0): 0.5
Biopsy Type: H and E
Electrodesiccation And Curettage Text: The wound bed was treated with electrodesiccation and curettage after the biopsy was performed.
Notification Instructions: Patient will be notified of biopsy results. However, patient instructed to call the office if not contacted within 2 weeks.
Dressing: bandage
Depth Of Biopsy: dermis
Post-Care Instructions: I reviewed with the patient in detail post-care instructions. Patient is to keep the biopsy site dry overnight, and then apply bacitracin twice daily until healed. Patient may apply hydrogen peroxide soaks to remove any crusting.
Was A Bandage Applied: Yes
Hemostasis: Drysol
Silver Nitrate Text: The wound bed was treated with silver nitrate after the biopsy was performed.
Anesthesia Type: 1% lidocaine with epinephrine
Biopsy Method: Dermablade
Information: Selecting Yes will display possible errors in your note based on the variables you have selected. This validation is only offered as a suggestion for you. PLEASE NOTE THAT THE VALIDATION TEXT WILL BE REMOVED WHEN YOU FINALIZE YOUR NOTE. IF YOU WANT TO FAX A PRELIMINARY NOTE YOU WILL NEED TO TOGGLE THIS TO 'NO' IF YOU DO NOT WANT IT IN YOUR FAXED NOTE.
Type Of Destruction Used: Curettage
Electrodesiccation Text: The wound bed was treated with electrodesiccation after the biopsy was performed.
Cryotherapy Text: The wound bed was treated with cryotherapy after the biopsy was performed.

## 2021-11-16 NOTE — MR AVS SNAPSHOT
"              After Visit Summary   12/3/2018    Mary Lemon    MRN: [id number]           Patient Information     Date Of Birth          1950        Visit Information        Provider Department      12/3/2018 1:30 PM Gorge Yeager PA-C [facility]        Today's Diagnoses     Chronic anticoagulation    -  1    History of deep venous thrombosis        History of pulmonary embolism          Care Instructions    1.  Call with surgeries or procedures.  2.  Stay on Warfarin.  3.  See Dr. Schmitz or NITISH Leyva back in 1 year.          Follow-ups after your visit        Follow-up notes from your care team     Return in about 1 year (around 12/3/2019).      Who to contact     If you have questions or need follow up information about today's clinic visit or your schedule please contact [facility] directly at [phone].  Normal or non-critical lab and imaging results will be communicated to you by [employer], letter or phone within 4 business days after the clinic has received the results. If you do not hear from us within 7 days, please contact the clinic through [employer] or phone. If you have a critical or abnormal lab result, we will notify you by phone as soon as possible.  Submit refill requests through [employer]. or call your pharmacy and they will forward the refill request to us. Please allow 3 business days for your refill to be completed.          Additional Information About Your Visit        [employer] Information     [employer]. lets you send messages to your doctor, view your test results, renew your prescriptions, schedule appointments and more. To sign up, go to [email or web address] . Click on \"Log in\" on the left side of the screen, which will take you to the Welcome page. Then click on \"Sign up Now\" on the right side of the page.     You will be asked to enter the access code listed below, as well as some personal information. Please " "follow the directions to create your username and password.     Your access code is: [email or web address]  Expires: 3/3/2019  1:58 PM     Your access code will  in 90 days. If you need help or a new code, please call your [facility] clinic or [phone].        Care EveryWhere ID     This is your Care EveryWhere ID. This could be used by other organizations to access your [facility] medical records  [id number]        Your Vitals Were     Pulse Temperature Respirations Height Pulse Oximetry BMI (Body Mass Index)    64 97.6  F (36.4  C) (Oral) 12 1.651 m (5' 5\") 97% 21.32 kg/m2       Blood Pressure from Last 3 Encounters:   18 136/74   17 112/61   10/17/16 125/75    Weight from Last 3 Encounters:   18 58.1 kg (128 lb 1.6 oz)   17 58.6 kg (129 lb 1.6 oz)   10/17/16 59.1 kg (130 lb 4.8 oz)              Today, you had the following     No orders found for display         Today's Medication Changes          These changes are accurate as of 12/3/18  2:03 PM.  If you have any questions, ask your nurse or doctor.               Stop taking these medicines if you haven't already. Please contact your care team if you have questions.     lovastatin 40 MG tablet   Commonly known as:  MEVACOR                    Primary Care Provider Office Phone # Fax #    Cherelle Lee [phone]       [facility] [address]  [facility] [address]        Equal Access to Services     [facility] CenterMEGHAN : Hadii judd Brunson, waaxda luqadaha, qaybta kaalpito licona. So [facility] [phone].    ATENCIÓN: Si habla español, tiene a moseley disposición servicios gratuitos de asistencia lingüística. Llame al [phone].    We comply with applicable federal civil rights laws and [place] laws. We do not discriminate on the basis of race, color, national origin, age, disability, sex, sexual orientation, or gender identity.            Thank you!     " Thank you for choosing [facility]  for your care. Our goal is always to provide you with excellent care. Hearing back from our patients is one way we can continue to improve our services. Please take a few minutes to complete the written survey that you may receive in the mail after your visit with us. Thank you!             Your Updated Medication List - Protect others around you: Learn how to safely use, store and throw away your medicines at www.disposemymeds.org.          This list is accurate as of 12/3/18  2:03 PM.  Always use your most recent med list.                   Brand Name Dispense Instructions for use Diagnosis    calcium carbonate 500 MG tablet    OS-QUIRINO     Take 1 tablet by mouth 2 times daily        COUMADIN PO           GEMFIBROZIL PO      Take 300 mg by mouth 2 times daily (before meals)        VITAMIN D (CHOLECALCIFEROL) PO      Take 1,000 Units by mouth daily           18

## 2022-01-15 ENCOUNTER — HEALTH MAINTENANCE LETTER (OUTPATIENT)
Age: 72
End: 2022-01-15

## 2022-04-26 ENCOUNTER — APPOINTMENT (OUTPATIENT)
Dept: URBAN - METROPOLITAN AREA CLINIC 254 | Age: 72
Setting detail: DERMATOLOGY
End: 2022-04-30

## 2022-04-26 VITALS — RESPIRATION RATE: 14 BRPM | HEIGHT: 65 IN | WEIGHT: 125 LBS

## 2022-04-26 DIAGNOSIS — D22 MELANOCYTIC NEVI: ICD-10-CM

## 2022-04-26 DIAGNOSIS — Z71.89 OTHER SPECIFIED COUNSELING: ICD-10-CM

## 2022-04-26 DIAGNOSIS — L82.1 OTHER SEBORRHEIC KERATOSIS: ICD-10-CM

## 2022-04-26 DIAGNOSIS — D18.0 HEMANGIOMA: ICD-10-CM

## 2022-04-26 DIAGNOSIS — L81.4 OTHER MELANIN HYPERPIGMENTATION: ICD-10-CM

## 2022-04-26 PROBLEM — D22.5 MELANOCYTIC NEVI OF TRUNK: Status: ACTIVE | Noted: 2022-04-26

## 2022-04-26 PROBLEM — D23.39 OTHER BENIGN NEOPLASM OF SKIN OF OTHER PARTS OF FACE: Status: ACTIVE | Noted: 2022-04-26

## 2022-04-26 PROBLEM — D18.01 HEMANGIOMA OF SKIN AND SUBCUTANEOUS TISSUE: Status: ACTIVE | Noted: 2022-04-26

## 2022-04-26 PROCEDURE — OTHER REASSURANCE: OTHER

## 2022-04-26 PROCEDURE — 99213 OFFICE O/P EST LOW 20 MIN: CPT

## 2022-04-26 PROCEDURE — OTHER COUNSELING: OTHER

## 2022-04-26 PROCEDURE — OTHER MIPS QUALITY: OTHER

## 2022-04-26 ASSESSMENT — LOCATION DETAILED DESCRIPTION DERM
LOCATION DETAILED: LEFT SUPERIOR MEDIAL MIDBACK
LOCATION DETAILED: RIGHT SUPERIOR MEDIAL UPPER BACK
LOCATION DETAILED: INFERIOR THORACIC SPINE

## 2022-04-26 ASSESSMENT — LOCATION SIMPLE DESCRIPTION DERM
LOCATION SIMPLE: UPPER BACK
LOCATION SIMPLE: RIGHT UPPER BACK
LOCATION SIMPLE: LEFT LOWER BACK

## 2022-04-26 ASSESSMENT — LOCATION ZONE DERM: LOCATION ZONE: TRUNK

## 2022-12-26 ENCOUNTER — HEALTH MAINTENANCE LETTER (OUTPATIENT)
Age: 72
End: 2022-12-26

## 2023-01-23 RX ORDER — VALACYCLOVIR 1 G/1
TABLET, FILM COATED ORAL
Qty: 4 | Refills: 1 | Status: ERX

## 2023-04-22 ENCOUNTER — HEALTH MAINTENANCE LETTER (OUTPATIENT)
Age: 73
End: 2023-04-22

## 2023-04-27 ENCOUNTER — APPOINTMENT (OUTPATIENT)
Dept: URBAN - METROPOLITAN AREA CLINIC 254 | Age: 73
Setting detail: DERMATOLOGY
End: 2023-04-27

## 2023-04-27 DIAGNOSIS — D22 MELANOCYTIC NEVI: ICD-10-CM

## 2023-04-27 DIAGNOSIS — L82.1 OTHER SEBORRHEIC KERATOSIS: ICD-10-CM

## 2023-04-27 DIAGNOSIS — L72.0 EPIDERMAL CYST: ICD-10-CM

## 2023-04-27 DIAGNOSIS — D18.0 HEMANGIOMA: ICD-10-CM

## 2023-04-27 DIAGNOSIS — L57.8 OTHER SKIN CHANGES DUE TO CHRONIC EXPOSURE TO NONIONIZING RADIATION: ICD-10-CM

## 2023-04-27 DIAGNOSIS — L57.0 ACTINIC KERATOSIS: ICD-10-CM

## 2023-04-27 DIAGNOSIS — Z85.820 PERSONAL HISTORY OF MALIGNANT MELANOMA OF SKIN: ICD-10-CM

## 2023-04-27 DIAGNOSIS — Z71.89 OTHER SPECIFIED COUNSELING: ICD-10-CM

## 2023-04-27 PROBLEM — D22.5 MELANOCYTIC NEVI OF TRUNK: Status: ACTIVE | Noted: 2023-04-27

## 2023-04-27 PROBLEM — D18.01 HEMANGIOMA OF SKIN AND SUBCUTANEOUS TISSUE: Status: ACTIVE | Noted: 2023-04-27

## 2023-04-27 PROCEDURE — OTHER COUNSELING: OTHER

## 2023-04-27 PROCEDURE — OTHER LIQUID NITROGEN: OTHER

## 2023-04-27 PROCEDURE — OTHER MIPS QUALITY: OTHER

## 2023-04-27 PROCEDURE — 99213 OFFICE O/P EST LOW 20 MIN: CPT | Mod: 25

## 2023-04-27 PROCEDURE — 17000 DESTRUCT PREMALG LESION: CPT

## 2023-04-27 ASSESSMENT — LOCATION SIMPLE DESCRIPTION DERM
LOCATION SIMPLE: GLABELLA
LOCATION SIMPLE: RIGHT UPPER BACK
LOCATION SIMPLE: LEFT UPPER BACK

## 2023-04-27 ASSESSMENT — LOCATION ZONE DERM
LOCATION ZONE: TRUNK
LOCATION ZONE: FACE

## 2023-04-27 ASSESSMENT — LOCATION DETAILED DESCRIPTION DERM
LOCATION DETAILED: GLABELLA
LOCATION DETAILED: RIGHT MID-UPPER BACK
LOCATION DETAILED: RIGHT INFERIOR UPPER BACK
LOCATION DETAILED: LEFT SUPERIOR UPPER BACK

## 2023-04-27 NOTE — PROCEDURE: LIQUID NITROGEN
Render Note In Bullet Format When Appropriate: No
Show Aperture Variable?: Yes
Detail Level: Detailed
Consent: The patient's consent was obtained including but not limited to risks of crusting, scabbing, blistering, scarring, darker or lighter pigmentary change, recurrence, incomplete removal and infection.
Number Of Freeze-Thaw Cycles: 3 freeze-thaw cycles
Post-Care Instructions: I reviewed with the patient in detail post-care instructions. Patient is to wear sunprotection, and avoid picking at any of the treated lesions. Pt may apply Vaseline to crusted or scabbing areas.
Duration Of Freeze Thaw-Cycle (Seconds): 3

## 2023-07-21 ENCOUNTER — TELEPHONE (OUTPATIENT)
Dept: HEMATOLOGY | Facility: CLINIC | Age: 73
End: 2023-07-21
Payer: MEDICARE

## 2023-07-21 NOTE — CONFIDENTIAL NOTE
[id number]  Mary Lemon  72 year old female  CBCD Diagnosis: Recurrent VTE  CBCD Provider: Ainsley    Mary is on LTA with warfarin. She is calling today to discuss alternative anticoagulation options. [facility] Xarelto has a medicare program:    - Pay $85, plus sales tax if applicable, for a 30-day (1-month) supply of XARELTO .  Or   - Pay $240 for a 90-day (3-month) supply of XARELTO  ($80 per month), plus sales tax if applicable, if you and your doctor choose a 90-day supply.    RN is unsure if Mary is a candidate for Xarelto. She will have a telephone visit on Monday with Dr. Schmitz to discuss alternate options. [facility] pharmacy has volunteered to run test claims to assess affordability.     Rosangela ELY, RN, PHN  [facility]  Office: [phone]  Fax: [phone]

## 2023-07-24 ENCOUNTER — VIRTUAL VISIT (OUTPATIENT)
Dept: HEMATOLOGY | Facility: CLINIC | Age: 73
End: 2023-07-24
Attending: INTERNAL MEDICINE
Payer: MEDICARE

## 2023-07-24 VITALS — WEIGHT: 125 LBS | BODY MASS INDEX: 20.8 KG/M2

## 2023-07-24 DIAGNOSIS — Z79.01 CURRENT USE OF LONG TERM ANTICOAGULATION: ICD-10-CM

## 2023-07-24 DIAGNOSIS — Z86.718 HISTORY OF RECURRENT DEEP VEIN THROMBOSIS (DVT): Primary | ICD-10-CM

## 2023-07-24 DIAGNOSIS — Z86.711 HISTORY OF PULMONARY EMBOLISM: ICD-10-CM

## 2023-07-24 PROCEDURE — 99214 OFFICE O/P EST MOD 30 MIN: CPT | Mod: 95 | Performed by: INTERNAL MEDICINE

## 2023-07-24 RX ORDER — BUSPIRONE HYDROCHLORIDE 15 MG/1
15 TABLET ORAL 2 TIMES DAILY
COMMUNITY

## 2023-07-24 NOTE — PROGRESS NOTES
[facility]  Center for Bleeding and Clotting Disorders  [address], [address], [place] [address]  Main: [phone], Fax: [phone]        Outpatient Clinic Visit  Date:  07/24/2023    NOTE:  This visit was conducted by telephone, with the patient's approval.  Patient location: Home  Provider location: Onsite      Mary Lemon is a 72-year-old woman with a history of recurrent venous thromboembolism whose visit today was to discuss her long-term anticoagulation plan.    Background history:  She had a provoked right calf deep vein thrombosis in 1999 associated with hormone replacement therapy for which she received 3 months of anticoagulation.  She had another provoked right lower extremity deep vein thrombosis in 2006 secondary to a prolonged car ride for which she received 6 months of anticoagulation.  In March 2015 she had bilateral pulmonary emboli provoked again by prolonged car travel.  She has remained on long-term anticoagulation since that time.  In November 2015 we stopped her anticoagulation to allow for laboratory testing to try to determine the degree of activation of her clotting system at baseline.  Those results returned showing an elevated  D-dimer of 0.9 and factor VIII activity of 232.  Fibrinogen and prothrombin fragment levels were normal.  Note that she has had factor VIII levels elevated in the 230% range on two previous occasions dating back over the last 10 years as well.  Previous full hypercoagulable workup was unrevealing.  After the results of those tests, she elected to resume anticoagulation with warfarin.      Interval history:  Mary requested today's appointment to discuss alternatives to warfarin.  Although she has done well overall on long-term warfarin therapy, over the last year she has had frequent urinary tract infections and has had more difficulty with labile INRs.  She also reports that she has been told certain antibiotics are best  avoided given her concurrent warfarin therapy and this has limited options for treating the UTIs.  She is thus interested in discussing other oral anticoagulant options.  Her overall health has remained good.    Physical exam:  Not done, telephone visit.    Labs:  Review of outside labs from July 2023 shows normal liver and renal function, and a normal CBC.      ASSESSMENT AND PLAN:   1.  Provoked right lower extremity deep vein thrombosis in 1999 associated with hormone replacement therapy, status post 3 months of anticoagulation.   2.  Provoked right lower extremity deep vein thrombosis in 2006 secondary to prolonged car travel, status post 6 months of anticoagulation.    3.  Bilateral pulmonary emboli in March 2015 probably provoked by prolonged car travel, status post  6 months of anticoagulation.   4.  Subsequent laboratory testing showing evidence of baseline activation of the clotting system with an elevated  D-dimer and a persistently elevated factor VIII level.   5.  Long-term anticoagulation.      Given her history, we continue to feel that Mary is appropriate for long-term anticoagulation.  In general, she has tolerated this well, without bleeding issues.    Over the last year, she has had frequent urinary tract infections and this has contributed to more labile INR values.  She also reports that antibiotic selection has been limited by her concurrent warfarin therapy.  Thus, she is interested in discussing alternatives.    We reviewed the options of switching to a direct oral anticoagulant.  We have discussed this in the past as well, but the cost was prohibitive.  However, there is a newer program available with her current insurance coverage that would allow her to receive rivaroxaban for a more reasonable price.  She will discuss this with with her  and contact us if she would like to make the switch.     Total time 30 minutes, all in counseling and coordination of care.      Onur Schmitz,  MD  Professor of Medicine  Division of Hematology, Oncology, and Transplantation  Director, [facility]

## 2023-07-28 DIAGNOSIS — Z86.711 HISTORY OF PULMONARY EMBOLISM: ICD-10-CM

## 2023-07-28 DIAGNOSIS — Z86.718 HISTORY OF RECURRENT DEEP VEIN THROMBOSIS (DVT): ICD-10-CM

## 2023-07-28 DIAGNOSIS — Z79.01 CURRENT USE OF LONG TERM ANTICOAGULATION: Primary | ICD-10-CM

## 2023-09-12 ENCOUNTER — TELEPHONE (OUTPATIENT)
Dept: HEMATOLOGY | Facility: CLINIC | Age: 73
End: 2023-09-12
Payer: MEDICARE

## 2023-09-12 NOTE — CONFIDENTIAL NOTE
[id number]  Mary S Ion  72 year old female  CBCD Diagnosis: Recurrent provoked DVT, Persistently elevated d-dimer and elevated FVIII  CBCD Provider: Ainsley QUESADA received a phone call from Mary. She would like to switch to Xarelto as soon as possible after a conversation with her Internist. She is wondering about the dosing and how to switch. Also she mentions she is having a colonoscopy on 10/18/23 at [facility] and will need hold orders.     RN will discuss with Dr. Schmitz and call Mary back at the end of the day.     Rosangela Burt  MSN, RN, PHN  [facility]  Office: [phone]  Fax: [phone]    Addendum  Okay to go to 20 mg because she is at a higher risk for recurrent clotting. She should transition to warfarin to Xarelto by stopping warfarin and starting Xarelto when INR is less than 3.0. For her upcoming colonoscopy she can stop 3 days before and resume the day after. She should call us with any bleeding issues/questions/comments/concerns. Mary is aware and has our contact information.    Rosangela ELY, RN, N  [facility]  Office: [phone]  Fax: [phone]

## 2023-09-15 ENCOUNTER — MYC MEDICAL ADVICE (OUTPATIENT)
Dept: HEMATOLOGY | Facility: CLINIC | Age: 73
End: 2023-09-15
Payer: MEDICARE

## 2023-12-04 ENCOUNTER — RX ONLY (RX ONLY)
Age: 73
End: 2023-12-04

## 2023-12-04 RX ORDER — VALACYCLOVIR 1 G/1
TABLET, FILM COATED ORAL
Qty: 20 | Refills: 1 | Status: ERX | COMMUNITY
Start: 2023-12-04

## 2024-02-04 ENCOUNTER — HEALTH MAINTENANCE LETTER (OUTPATIENT)
Age: 74
End: 2024-02-04

## 2024-05-07 ENCOUNTER — APPOINTMENT (OUTPATIENT)
Dept: URBAN - METROPOLITAN AREA CLINIC 254 | Age: 74
Setting detail: DERMATOLOGY
End: 2024-05-08

## 2024-05-07 VITALS — WEIGHT: 125 LBS | HEIGHT: 65 IN

## 2024-05-07 DIAGNOSIS — Z71.89 OTHER SPECIFIED COUNSELING: ICD-10-CM

## 2024-05-07 DIAGNOSIS — L81.4 OTHER MELANIN HYPERPIGMENTATION: ICD-10-CM

## 2024-05-07 DIAGNOSIS — L57.8 OTHER SKIN CHANGES DUE TO CHRONIC EXPOSURE TO NONIONIZING RADIATION: ICD-10-CM

## 2024-05-07 DIAGNOSIS — D18.0 HEMANGIOMA: ICD-10-CM

## 2024-05-07 DIAGNOSIS — L82.1 OTHER SEBORRHEIC KERATOSIS: ICD-10-CM

## 2024-05-07 DIAGNOSIS — D22 MELANOCYTIC NEVI: ICD-10-CM

## 2024-05-07 DIAGNOSIS — L72.0 EPIDERMAL CYST: ICD-10-CM

## 2024-05-07 PROBLEM — D22.5 MELANOCYTIC NEVI OF TRUNK: Status: ACTIVE | Noted: 2024-05-07

## 2024-05-07 PROBLEM — D18.01 HEMANGIOMA OF SKIN AND SUBCUTANEOUS TISSUE: Status: ACTIVE | Noted: 2024-05-07

## 2024-05-07 PROCEDURE — OTHER MIPS QUALITY: OTHER

## 2024-05-07 PROCEDURE — OTHER COUNSELING: OTHER

## 2024-05-07 PROCEDURE — OTHER REASSURANCE: OTHER

## 2024-05-07 PROCEDURE — 99213 OFFICE O/P EST LOW 20 MIN: CPT

## 2024-05-07 ASSESSMENT — LOCATION SIMPLE DESCRIPTION DERM
LOCATION SIMPLE: RIGHT FOREHEAD
LOCATION SIMPLE: LEFT UPPER BACK
LOCATION SIMPLE: LEFT CHEEK

## 2024-05-07 ASSESSMENT — LOCATION DETAILED DESCRIPTION DERM
LOCATION DETAILED: RIGHT INFERIOR FOREHEAD
LOCATION DETAILED: RIGHT FOREHEAD
LOCATION DETAILED: LEFT SUPERIOR MEDIAL UPPER BACK
LOCATION DETAILED: LEFT MEDIAL UPPER BACK
LOCATION DETAILED: LEFT INFERIOR CENTRAL MALAR CHEEK

## 2024-05-07 ASSESSMENT — LOCATION ZONE DERM
LOCATION ZONE: FACE
LOCATION ZONE: TRUNK

## 2024-05-07 NOTE — HPI: FULL BODY SKIN EXAMINATION
How Severe Are Your Spot(S)?: mild
What Type Of Note Output Would You Prefer (Optional)?: Bullet Format
What Is The Reason For Today's Visit?: Full Body Skin Examination
What Is The Reason For Today's Visit? (Being Monitored For X): concerning skin lesions on an annual basis
Additional History: Patient presents for FBSE reports no concerns at this time.

## 2024-08-05 ENCOUNTER — VIRTUAL VISIT (OUTPATIENT)
Dept: HEMATOLOGY | Facility: CLINIC | Age: 74
End: 2024-08-05
Attending: PHYSICIAN ASSISTANT
Payer: MEDICARE

## 2024-08-05 DIAGNOSIS — Z86.711 HISTORY OF PULMONARY EMBOLISM: ICD-10-CM

## 2024-08-05 DIAGNOSIS — Z79.01 CURRENT USE OF LONG TERM ANTICOAGULATION: ICD-10-CM

## 2024-08-05 DIAGNOSIS — Z86.718 HISTORY OF RECURRENT DEEP VEIN THROMBOSIS (DVT): ICD-10-CM

## 2024-08-05 PROCEDURE — 99441 PR PHYSICIAN TELEPHONE EVALUATION 5-10 MIN: CPT | Mod: 93 | Performed by: PHYSICIAN ASSISTANT

## 2024-08-05 RX ORDER — ROSUVASTATIN CALCIUM 10 MG/1
10 TABLET, COATED ORAL DAILY
COMMUNITY

## 2024-08-05 NOTE — PROGRESS NOTES
Patient was contacted to complete the pre-visit call prior to their telephone visit with the provider.     Allergies and medications were reviewed.     I thanked them for their time to cover this information.     Tiffanie Gandhi MA

## 2024-08-05 NOTE — PROGRESS NOTES
[facility]  [facility] for Ble[facility] Clotting Disorders  [facility] [address], [address], [place] [address]  Main: [phone], Fax: [phone]      Outpatient Telephone Visit Note:    Patient: Mary Lemon  MRN: [id number]  : 1950  VIRAL: Aug 5, 2024    History of Present Illness:  Mary Lemon is a 73-year-old female with a history of recurrent venous thromboembolism that is maintained on chronic anticoagulation and presents today for routine follow-up. She has previously been seen by my colleague, Dr. Schmitz. Please refer to his previous notes for additional details regarding her history, which is summarized below.    Summary of Pertinent Clinical History:   --Distal right lower extremity DVT 2/2 HRT s/p 3 months of anticoagulation.   --Recurrent right lower extremity DVT 2/2 prolonged travel s/p 6 months of anticoagulation.    2015--Bilateral PE 2/2 prolonged travel. Has been on chronic anticoagulation since that time.   2015--Brief interruption of anticoagulation for labs. Found to have an elevated d-dimer and factor VIII level at baseline (230% range). Her thrombophilia work-up was otherwise unrevealing. Chronic anticoagulation was resumed.    Switched to Xarelto from warfarin in ~2023.    Interval History:  Continues on Xarelto 20mg daily. Tolerating it well. No bleeding concerns--major or nuisance. No recurrent venous thromboembolism.   Has a new primary care provider: Dr. Morales at [facility].    ROS:  Denies epistaxis, gingival bleeding, excessive bruising/ecchymosis, hematuria, hematochezia, and melena.   Denies LE pain/swelling. Denies chest pain. Denies shortness of breath.     Exam deferred as this was a telephone visit.      Assessment:  1) History of distal right lower extremity DVT 2/2 HRT s/p 3 months of anticoagulation ()  2) Recurrent right lower extremity DVT 2/2 prolonged travel s/p 6 months of anticoagulation ()  3)  Bilateral PE 2/2 prolonged travel (March 2015).  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    Plan:  1) Continue Xarelto 20mg once daily indefnitely. Can drop to 10mg if any nuisance bleeding should occur.   2) Due for CMP in December. Has primary care provider visit in October and will plan to have it done at that time.    Follow-up: yearly and PRN in the interim    PHONE VISIT  Patient location: Home.  Provider location: [facility].  Length of call: 6:05    20 minutes spent on the date of the encounter doing chart review, history and exam, documentation and further activities per the note.           Italia Nichols PA-C, [facility]  [facility]  [address] [place] [address], [address], [place] [address]  Main: [phone], Fax: [phone]

## 2024-08-06 ENCOUNTER — APPOINTMENT (OUTPATIENT)
Dept: URBAN - METROPOLITAN AREA CLINIC 254 | Age: 74
Setting detail: DERMATOLOGY
End: 2024-08-06

## 2024-08-06 VITALS — WEIGHT: 125 LBS | HEIGHT: 65 IN

## 2024-08-06 DIAGNOSIS — L57.8 OTHER SKIN CHANGES DUE TO CHRONIC EXPOSURE TO NONIONIZING RADIATION: ICD-10-CM

## 2024-08-06 DIAGNOSIS — D49.2 NEOPLASM OF UNSPECIFIED BEHAVIOR OF BONE, SOFT TISSUE, AND SKIN: ICD-10-CM

## 2024-08-06 PROCEDURE — 99213 OFFICE O/P EST LOW 20 MIN: CPT | Mod: 25

## 2024-08-06 PROCEDURE — OTHER BIOPSY BY SHAVE METHOD: OTHER

## 2024-08-06 PROCEDURE — OTHER MIPS QUALITY: OTHER

## 2024-08-06 PROCEDURE — OTHER SEPARATE AND IDENTIFIABLE DOCUMENTATION: OTHER

## 2024-08-06 PROCEDURE — OTHER PHOTO-DOCUMENTATION: OTHER

## 2024-08-06 PROCEDURE — 11102 TANGNTL BX SKIN SINGLE LES: CPT

## 2024-08-06 PROCEDURE — OTHER COUNSELING: OTHER

## 2024-08-06 ASSESSMENT — LOCATION ZONE DERM
LOCATION ZONE: TRUNK
LOCATION ZONE: ARM

## 2024-08-06 ASSESSMENT — LOCATION SIMPLE DESCRIPTION DERM
LOCATION SIMPLE: RIGHT SHOULDER
LOCATION SIMPLE: RIGHT UPPER BACK

## 2024-08-06 ASSESSMENT — LOCATION DETAILED DESCRIPTION DERM
LOCATION DETAILED: RIGHT SUPERIOR UPPER BACK
LOCATION DETAILED: RIGHT POSTERIOR SHOULDER

## 2024-08-06 NOTE — HPI: SKIN LESIONS
How Severe Is Your Skin Lesion?: mild
Have Your Skin Lesions Been Treated?: not been treated
Is This A New Presentation, Or A Follow-Up?: Skin Lesion
Additional History: Patient presents with scaly new lesion on right posterior shoulder.

## 2024-08-06 NOTE — PROCEDURE: BIOPSY BY SHAVE METHOD
Detail Level: Detailed
Depth Of Biopsy: dermis
Was A Bandage Applied: Yes
Size Of Lesion In Cm: 0
Biopsy Type: H and E
Biopsy Method: Dermablade
Anesthesia Type: 1% lidocaine with epinephrine
Anesthesia Volume In Cc: 0.5
Hemostasis: Drysol
Wound Care: Petrolatum
Dressing: bandage
Destruction After The Procedure: No
Type Of Destruction Used: Curettage
Curettage Text: The wound bed was treated with curettage after the biopsy was performed.
Cryotherapy Text: The wound bed was treated with cryotherapy after the biopsy was performed.
Electrodesiccation Text: The wound bed was treated with electrodesiccation after the biopsy was performed.
Electrodesiccation And Curettage Text: The wound bed was treated with electrodesiccation and curettage after the biopsy was performed.
Silver Nitrate Text: The wound bed was treated with silver nitrate after the biopsy was performed.
Lab: [id number]
Consent: Written consent was obtained and risks were reviewed including but not limited to scarring, infection, bleeding, scabbing, incomplete removal, nerve damage and allergy to anesthesia.
Post-Care Instructions: I reviewed with the patient in detail post-care instructions. Patient is to keep the biopsy site dry overnight, and then apply bacitracin twice daily until healed. Patient may apply hydrogen peroxide soaks to remove any crusting.
Notification Instructions: Patient will be notified of biopsy results. However, patient instructed to call the office if not contacted within 2 weeks.
Billing Type: Third-Party Bill
Information: Selecting Yes will display possible errors in your note based on the variables you have selected. This validation is only offered as a suggestion for you. PLEASE NOTE THAT THE VALIDATION TEXT WILL BE REMOVED WHEN YOU FINALIZE YOUR NOTE. IF YOU WANT TO FAX A PRELIMINARY NOTE YOU WILL NEED TO TOGGLE THIS TO 'NO' IF YOU DO NOT WANT IT IN YOUR FAXED NOTE.

## 2024-08-27 ENCOUNTER — APPOINTMENT (OUTPATIENT)
Dept: URBAN - METROPOLITAN AREA CLINIC 254 | Age: 74
Setting detail: DERMATOLOGY
End: 2024-08-27

## 2024-08-27 VITALS — HEIGHT: 61 IN | WEIGHT: 125 LBS

## 2024-08-27 DIAGNOSIS — L82.1 OTHER SEBORRHEIC KERATOSIS: ICD-10-CM

## 2024-08-27 DIAGNOSIS — L57.0 ACTINIC KERATOSIS: ICD-10-CM

## 2024-08-27 PROCEDURE — 99212 OFFICE O/P EST SF 10 MIN: CPT | Mod: 25

## 2024-08-27 PROCEDURE — 17000 DESTRUCT PREMALG LESION: CPT

## 2024-08-27 PROCEDURE — OTHER MIPS QUALITY: OTHER

## 2024-08-27 PROCEDURE — OTHER ADDITIONAL NOTES: OTHER

## 2024-08-27 PROCEDURE — OTHER COUNSELING: OTHER

## 2024-08-27 PROCEDURE — OTHER LIQUID NITROGEN: OTHER

## 2024-08-27 ASSESSMENT — LOCATION ZONE DERM
LOCATION ZONE: LEG
LOCATION ZONE: ARM

## 2024-08-27 ASSESSMENT — LOCATION SIMPLE DESCRIPTION DERM
LOCATION SIMPLE: LEFT ANKLE
LOCATION SIMPLE: RIGHT SHOULDER

## 2024-08-27 ASSESSMENT — LOCATION DETAILED DESCRIPTION DERM
LOCATION DETAILED: RIGHT POSTERIOR SHOULDER
LOCATION DETAILED: LEFT ANTERIOR MEDIAL MALLEOLUS

## 2024-08-27 NOTE — PROCEDURE: ADDITIONAL NOTES
Detail Level: Simple
Render Risk Assessment In Note?: no
Additional Notes: Bx proven in 2021. No concerns as of today’s OV

## 2024-08-29 ENCOUNTER — APPOINTMENT (OUTPATIENT)
Dept: URBAN - METROPOLITAN AREA CLINIC 254 | Age: 74
Setting detail: DERMATOLOGY
End: 2024-08-30

## 2024-08-29 VITALS — HEIGHT: 65 IN | WEIGHT: 125 LBS

## 2024-08-29 DIAGNOSIS — L57.0 ACTINIC KERATOSIS: ICD-10-CM

## 2024-08-29 PROCEDURE — OTHER MIPS QUALITY: OTHER

## 2024-08-29 PROCEDURE — OTHER COUNSELING: OTHER

## 2024-08-29 PROCEDURE — 99213 OFFICE O/P EST LOW 20 MIN: CPT | Mod: 24

## 2024-08-29 ASSESSMENT — PAIN INTENSITY VAS: HOW INTENSE IS YOUR PAIN 0 BEING NO PAIN, 10 BEING THE MOST SEVERE PAIN POSSIBLE?: 1/10 PAIN

## 2024-08-30 ENCOUNTER — APPOINTMENT (OUTPATIENT)
Dept: URBAN - METROPOLITAN AREA CLINIC 254 | Age: 74
Setting detail: DERMATOLOGY
End: 2024-08-30

## 2025-02-02 ENCOUNTER — HEALTH MAINTENANCE LETTER (OUTPATIENT)
Age: 75
End: 2025-02-02

## 2025-05-06 ENCOUNTER — APPOINTMENT (OUTPATIENT)
Dept: URBAN - METROPOLITAN AREA CLINIC 254 | Age: 75
Setting detail: DERMATOLOGY
End: 2025-05-08

## 2025-05-06 VITALS — HEIGHT: 65 IN | WEIGHT: 125 LBS

## 2025-05-06 DIAGNOSIS — L82.0 INFLAMED SEBORRHEIC KERATOSIS: ICD-10-CM

## 2025-05-06 DIAGNOSIS — D22 MELANOCYTIC NEVI: ICD-10-CM

## 2025-05-06 DIAGNOSIS — Z71.89 OTHER SPECIFIED COUNSELING: ICD-10-CM

## 2025-05-06 DIAGNOSIS — L82.1 OTHER SEBORRHEIC KERATOSIS: ICD-10-CM

## 2025-05-06 DIAGNOSIS — L57.0 ACTINIC KERATOSIS: ICD-10-CM

## 2025-05-06 PROBLEM — D22.39 MELANOCYTIC NEVI OF OTHER PARTS OF FACE: Status: ACTIVE | Noted: 2025-05-06

## 2025-05-06 PROCEDURE — OTHER PHOTO-DOCUMENTATION: OTHER

## 2025-05-06 PROCEDURE — OTHER ADDITIONAL NOTES: OTHER

## 2025-05-06 PROCEDURE — 17000 DESTRUCT PREMALG LESION: CPT | Mod: 59

## 2025-05-06 PROCEDURE — OTHER LIQUID NITROGEN: OTHER

## 2025-05-06 PROCEDURE — 99213 OFFICE O/P EST LOW 20 MIN: CPT | Mod: 25

## 2025-05-06 PROCEDURE — OTHER COUNSELING: OTHER

## 2025-05-06 PROCEDURE — 17110 DESTRUCT B9 LESION 1-14: CPT

## 2025-05-06 PROCEDURE — OTHER MIPS QUALITY: OTHER

## 2025-05-06 ASSESSMENT — LOCATION SIMPLE DESCRIPTION DERM
LOCATION SIMPLE: RIGHT TEMPLE
LOCATION SIMPLE: LEFT CHEEK
LOCATION SIMPLE: RIGHT PRETIBIAL REGION
LOCATION SIMPLE: LEFT ANKLE
LOCATION SIMPLE: LEFT UPPER BACK
LOCATION SIMPLE: LEFT THIGH

## 2025-05-06 ASSESSMENT — LOCATION DETAILED DESCRIPTION DERM
LOCATION DETAILED: RIGHT CENTRAL TEMPLE
LOCATION DETAILED: LEFT MID-UPPER BACK
LOCATION DETAILED: LEFT INFERIOR NASAL CHEEK
LOCATION DETAILED: LEFT ANTERIOR MEDIAL MALLEOLUS
LOCATION DETAILED: LEFT ANTERIOR DISTAL THIGH
LOCATION DETAILED: RIGHT PROXIMAL PRETIBIAL REGION

## 2025-05-06 ASSESSMENT — LOCATION ZONE DERM
LOCATION ZONE: LEG
LOCATION ZONE: FACE
LOCATION ZONE: TRUNK

## 2025-06-28 ENCOUNTER — RX ONLY (RX ONLY)
Age: 75
End: 2025-06-28

## 2025-06-28 RX ORDER — VALACYCLOVIR 1 G/1
TABLET, FILM COATED ORAL
Qty: 20 | Refills: 2 | Status: ERX

## 2025-08-18 ENCOUNTER — VIRTUAL VISIT (OUTPATIENT)
Dept: HEMATOLOGY | Facility: CLINIC | Age: 75
End: 2025-08-18
Attending: PHYSICIAN ASSISTANT
Payer: MEDICARE

## 2025-08-18 DIAGNOSIS — Z86.718 HISTORY OF RECURRENT DEEP VEIN THROMBOSIS (DVT): ICD-10-CM

## 2025-08-18 DIAGNOSIS — Z79.01 CURRENT USE OF LONG TERM ANTICOAGULATION: Primary | ICD-10-CM

## 2025-08-18 DIAGNOSIS — Z86.711 HISTORY OF PULMONARY EMBOLISM: ICD-10-CM

## 2025-08-18 PROCEDURE — 98013 SYNCH AUDIO-ONLY EST LOW 20: CPT | Performed by: PHYSICIAN ASSISTANT

## 2025-08-26 ENCOUNTER — APPOINTMENT (OUTPATIENT)
Dept: URBAN - METROPOLITAN AREA CLINIC 254 | Age: 75
Setting detail: DERMATOLOGY
End: 2025-08-30

## 2025-08-26 VITALS — WEIGHT: 125 LBS | HEIGHT: 66 IN

## 2025-08-26 DIAGNOSIS — L57.0 ACTINIC KERATOSIS: ICD-10-CM

## 2025-08-26 DIAGNOSIS — D49.2 NEOPLASM OF UNSPECIFIED BEHAVIOR OF BONE, SOFT TISSUE, AND SKIN: ICD-10-CM

## 2025-08-26 PROCEDURE — OTHER MIPS QUALITY: OTHER

## 2025-08-26 PROCEDURE — OTHER LIQUID NITROGEN: OTHER

## 2025-08-26 PROCEDURE — OTHER COUNSELING: OTHER

## 2025-08-26 PROCEDURE — 99213 OFFICE O/P EST LOW 20 MIN: CPT | Mod: 25

## 2025-08-26 PROCEDURE — 17000 DESTRUCT PREMALG LESION: CPT | Mod: 59

## 2025-08-26 PROCEDURE — 11102 TANGNTL BX SKIN SINGLE LES: CPT

## 2025-08-26 PROCEDURE — OTHER BIOPSY BY SHAVE METHOD: OTHER

## 2025-08-26 ASSESSMENT — LOCATION SIMPLE DESCRIPTION DERM
LOCATION SIMPLE: LEFT NOSE
LOCATION SIMPLE: RIGHT ZYGOMA
LOCATION SIMPLE: LEFT CHEEK

## 2025-08-26 ASSESSMENT — LOCATION ZONE DERM
LOCATION ZONE: NOSE
LOCATION ZONE: FACE

## 2025-08-26 ASSESSMENT — LOCATION DETAILED DESCRIPTION DERM
LOCATION DETAILED: LEFT MEDIAL MALAR CHEEK
LOCATION DETAILED: LEFT NASAL SIDEWALL
LOCATION DETAILED: RIGHT CENTRAL ZYGOMA